# Patient Record
Sex: FEMALE | Race: WHITE
[De-identification: names, ages, dates, MRNs, and addresses within clinical notes are randomized per-mention and may not be internally consistent; named-entity substitution may affect disease eponyms.]

---

## 2018-02-27 ENCOUNTER — HOSPITAL ENCOUNTER (OUTPATIENT)
Dept: HOSPITAL 45 - C.ACU | Age: 48
Discharge: HOME | End: 2018-02-27
Attending: OTOLARYNGOLOGY
Payer: COMMERCIAL

## 2018-02-27 VITALS — DIASTOLIC BLOOD PRESSURE: 65 MMHG | SYSTOLIC BLOOD PRESSURE: 126 MMHG | HEART RATE: 78 BPM | OXYGEN SATURATION: 96 %

## 2018-02-27 VITALS
BODY MASS INDEX: 46.35 KG/M2 | WEIGHT: 245.51 LBS | BODY MASS INDEX: 46.35 KG/M2 | BODY MASS INDEX: 46.35 KG/M2 | HEIGHT: 60.98 IN | WEIGHT: 245.51 LBS | HEIGHT: 60.98 IN

## 2018-02-27 VITALS
DIASTOLIC BLOOD PRESSURE: 72 MMHG | HEART RATE: 60 BPM | TEMPERATURE: 97.88 F | OXYGEN SATURATION: 98 % | SYSTOLIC BLOOD PRESSURE: 116 MMHG

## 2018-02-27 VITALS
TEMPERATURE: 98.24 F | DIASTOLIC BLOOD PRESSURE: 56 MMHG | HEART RATE: 62 BPM | SYSTOLIC BLOOD PRESSURE: 112 MMHG | OXYGEN SATURATION: 93 %

## 2018-02-27 VITALS — DIASTOLIC BLOOD PRESSURE: 64 MMHG | OXYGEN SATURATION: 96 % | SYSTOLIC BLOOD PRESSURE: 128 MMHG | HEART RATE: 80 BPM

## 2018-02-27 VITALS — DIASTOLIC BLOOD PRESSURE: 62 MMHG | SYSTOLIC BLOOD PRESSURE: 138 MMHG | OXYGEN SATURATION: 96 % | HEART RATE: 82 BPM

## 2018-02-27 DIAGNOSIS — Z99.89: ICD-10-CM

## 2018-02-27 DIAGNOSIS — Z85.850: ICD-10-CM

## 2018-02-27 DIAGNOSIS — G47.33: ICD-10-CM

## 2018-02-27 DIAGNOSIS — K21.9: ICD-10-CM

## 2018-02-27 DIAGNOSIS — Z85.828: ICD-10-CM

## 2018-02-27 DIAGNOSIS — Z90.49: ICD-10-CM

## 2018-02-27 DIAGNOSIS — J34.2: ICD-10-CM

## 2018-02-27 DIAGNOSIS — K58.9: ICD-10-CM

## 2018-02-27 DIAGNOSIS — J32.2: Primary | ICD-10-CM

## 2018-02-27 DIAGNOSIS — E66.01: ICD-10-CM

## 2018-02-27 DIAGNOSIS — N30.40: ICD-10-CM

## 2018-02-27 DIAGNOSIS — Z90.710: ICD-10-CM

## 2018-02-27 DIAGNOSIS — E03.9: ICD-10-CM

## 2018-02-27 DIAGNOSIS — F17.210: ICD-10-CM

## 2018-02-27 NOTE — DISCHARGE INSTRUCTIONS
Discharge Instructions


Date of Service


Feb 27, 2018.





Admission


Reason for Admission:  Chronic Maxillary Sinusitis, Deviated Septum, B/L





Discharge


Discharge Diagnosis / Problem:  Recurrent sinusitis, deviated nasal septum, 

turbinate hypertrophy.





Discharge Goals


Goal(s):  Improve function





Activity Recommendations


Activity Limitations:  as noted below


Lifting Limitations:  no more than 5 pounds


Exercise/Sports Limitations:  none


May Resume Sexual Activity:  after one week


Shower/Bathe:  no limitations


Driving or Machine Use:  


No driving a motor vehicle while taking a narcotic.


.





Current Hospital Diet


Patient's current hospital diet:





Discharge Diet


Recommended Diet:  Regular Diet





Pending Studies


Studies pending at discharge:  no





Medical Emergencies








.


Who to Call and When:





Medical Emergencies:  If at any time you feel your situation is an emergency, 

please call 911 immediately.





.





Non-Emergent Contact


Non-Emergency issues call your:  Specialist


Call Non-Emergent contact if:  temperature is above 101.5





.


.








"Provider Documentation" section prepared by Edgardo De La Torre.








.





VTE Core Measure


Inpt VTE Proph given/why not?:  SCD's

## 2018-02-27 NOTE — OPERATIVE REPORT
DATE OF OPERATION:  02/27/2018

 

SURGEON:  Dr. De La Torre.

 

ANESTHESIA:  General via endotracheal tube.

 

PROCEDURE PERFORMED:

1.  Nasal and sinus surgical endoscopy using image guidance resulting in:



   A.  Bilateral anterior ethmoidectomy (30871-86).

   B.  Left maxillary sinusotomy with removal of retention cyst (80977).

   C.  Right maxillary sinusotomy (99919).

 

2.  Septoplasty (17133).



3.  Bilateral inferior turbinate reduction (03805-44).

 

INDICATIONS FOR THE PROCEDURE:  This is a 48-year-old woman with recurrent

acute sinusitis meeting criteria for nasal and sinus surgical endoscopy and

she also suffers from nasal airway obstruction secondary to a deviated nasal

septum and turbinate hypertrophy.  Given the failure of medical management,

the patient has opted for surgical management.  A full informed consent

including the indications, risks, benefits, and alternatives was provided in

a relaxed office setting.  There was an opportunity for questions and

answers.

 

PREOPERATIVE DIAGNOSES:

1.  Chronic sinusitis.

2.  Deviated nasal septum.

3.  Bilateral inferior turbinate hypertrophy.

 

POSTOPERATIVE DIAGNOSES:

1.  Chronic sinusitis.

2.  Deviated nasal septum.

3.  Bilateral inferior turbinate hypertrophy.

 

BLOOD LOSS:  75 mL.

 

SPECIMENS SENT:  None.

 

COMPLICATIONS:  None.

 

SPONGE AND NEEDLE COUNT:  Correct at the end the case.

 

DESCRIPTION OF PROCEDURE:  The patient had an uneventful endotracheal

intubation via the CRNA.  The table was placed in reverse Trendelenburg

position, the oropharynx was packed with a vaginal pack soaked in saline. 

The septum, inferior turbinates, middle turbinates, and anterior ethmoidal

bullae were injected with 20 mL of 1% lidocaine 1:100,000 parts epinephrine. 

A 10 mL control syringe and a 25 gauge spinal needle were used to achieve

this.

 

After the injections, the middle meatus was packed with 2 neurosurgical

pledgets soaked in 4% cocaine.  An additional 2 neurosurgical pledgets per

side were soaked in 0.05% oxymetazoline and placed in the nose.

 

Equipment was readied, and the patient was draped while I scrubbed.  About 5

minutes was allowed for vasoconstriction.

 

First, attention was directed to the septum.  A Jesus incision was made on

the left side of the nose and a mucoperichondrial flap was elevated.  The

quadrangular cartilage was disarticulated from the perpendicular plate of the

ethmoid and the vomer using a [place] elevator.  A small strip of

quadrangular cartilage at its attachment of the maxillary crest was removed. 

Relaxing incisions were made in the quadrangular cartilage.  Also, a deviated

portion of the vomer and the left nasal airway was fractured back to the

midline.  The septum, which had previously been deviated to the left nasal

airway was now in the midline.  The incision was closed with running 4-0

chromic suture.  After the sinus work was completed, Xomed bivalve splints were 
placed on

each side of the septum, and held in place with a transseptal 2-0 silk suture.

 

After completing the septoplasty, attention was directed to the inferior

turbinates bilaterally.  The identical procedure was done for each inferior

turbinate.  Using the microdebrider, the lateral mucosa of each inferior

turbinate and submucosal bone was largely removed with the microdebrider. 

Remaining medial surface mucosa was then decreased in size using the

Arthrocare coblation plasma 1 on a setting of 6 for coblation.  There were

2 mucosal passes made on the medial surface of each inferior turbinate

lasting 10 seconds each.  There was 1 pass made on the remaining lateral surface

lasting 10 seconds.  This effected a nice reduction in inferior turbinate

size bilaterally.

 

The remaining part of the procedure was performed using the Medtronix Fusion

navigation system.  The technique involved use of a 30 endoscope with

surgical loops and surgical headlight used interchangeably.  The anterior

portion of the each middle turbinate was removed using first a curved

tonsillar hemostat.  Then, turbinate scissors were used to cut on the crush

line.  This provided excellent approach to the anterior ethmoidal bulla

bilaterally which was entered with straight suction and then opened with the

microdebrider.

 

Bilaterally, the maxillary sinuses were entered with a curved suction tip and

backbiting forceps were used to create wide sinusotomies.  No tissue was

removed from the right maxillary sinus.  On the left maxillary sinus, there

was a retention cyst that was removed with a curved Medtronix microdebrider. 

The previous microdebrider work had been with a straight microdebrider.  The

curved microdebrider allowed for easy removal of the retention cyst of the

left maxillary sinus.  Any nuisance bleeding was stopped with suction

electrocautery.  The ethmoid defect was filled with FloSeal followed by a

Nasopore, impregnated in bacitracin solution.  Telfa was wrapped around

straws and then dipped in bacitracin ointment and placed along the floor of

the nose bilaterally.  The patency of each straw was checked with a straight

suction tip and the straws were patent.  Mustache dressing was positioned.

 

I removed the vaginal pack placed at the start of the case from the

oropharynx.  The oropharynx was irrigated with saline and I checked the

oropharynx by suctioning with the [employer] BEARD[employer] LADYuer suction to make sure there was no

blood coming down from the nasopharynx to the oropharynx.  There were no

blood clots in the oropharynx or hypopharynx.  There was no loose bone

matter.  I placed an oral airway under direct visualization for the CRNA. 

With the patient breathing on her own, she was extubated and transported to

recovery in no apparent distress.

 

SUMMARY OF FINDINGS:

1. Deviated septum to the left side.

2. Bilateral inferior turbinate hypertrophy.

3. Minimally edematous ethmoid sinus mucosa.

4. Right maxillary sinus mucosa within normal limits.

5. A retention cyst, left maxillary sinus.

 

CONDITION:  The patient was transported to recovery in no apparent distress.

 

 

I attest to the content of the Intraoperative Record and any orders documented 
therein. Any exceptions are noted below.

 

 

 

MTDD

## 2018-02-27 NOTE — [facility] POST OPERATIVE BRIEF NOTE
Immediate Operative Summary


Operative Date


Feb 27, 2018.





Pre-Operative Diagnosis





Chronic sinusitis, deviated nasal septum, bilateral inferiorturbinate


hypetrophy.





Post-Operative Diagnosis





Same





Procedure(s) Performed





Bilateral Anterior Ethmoidectomy, Left Maxillary Sinusotomy with Removal of 


Tissue, Right Maxillary Sinusotomy, Septoplasty, Bilateral Inferior Turbinate 


Reduction





Surgeon


Dr De La Torre





Assistant Surgeon(s)


none





Estimated Blood Loss


75ml





Findings


Consistent with Post-Op Diagnosis





Specimens





none





Drains


None





Anesthesia Type


General





Complication(s)


none





Disposition


Accompanied Pt To Recover:  no


Disposition:  Recovery Room / PACU

## 2018-02-27 NOTE — ANESTHESIOLOGY PROGRESS NOTE
Anesthesia Post Op Note


Date & Time


Feb 27, 2018 at 11:19





Vital Signs


Pain Intensity:  0





Vital Signs Past 12 Hours








  Date Time  Temp Pulse Resp B/P (MAP) Pulse Ox O2 Delivery O2 Flow Rate FiO2


 


2/27/18 11:10  68 16 137/74 97 Oxymask 3 


 


2/27/18 11:00  68 16 120/45 96 Oxymask 5 


 


2/27/18 10:51 36.1 74 18 141/85 93 Oxymask 10 


 


2/27/18 07:25 36.8 62 18 112/56 (74) 93 Room Air  











Notes


Mental Status:  alert / awake / arousable, participated in evaluation


Pt Amnestic to Procedure:  Yes


Nausea / Vomiting:  adequately controlled


Pain:  adequately controlled


Airway Patency, RR, SpO2:  stable & adequate


BP & HR:  stable & adequate


Hydration State:  stable & adequate


Anesthetic Complications:  no major complications apparent

## 2018-05-03 ENCOUNTER — HOSPITAL ENCOUNTER (EMERGENCY)
Dept: HOSPITAL 45 - C.EDB | Age: 48
Discharge: HOME | End: 2018-05-03
Payer: COMMERCIAL

## 2018-05-03 VITALS — DIASTOLIC BLOOD PRESSURE: 54 MMHG | HEART RATE: 63 BPM | SYSTOLIC BLOOD PRESSURE: 101 MMHG | OXYGEN SATURATION: 95 %

## 2018-05-03 VITALS
HEIGHT: 62.01 IN | BODY MASS INDEX: 44.86 KG/M2 | WEIGHT: 246.92 LBS | WEIGHT: 246.92 LBS | HEIGHT: 62.01 IN | BODY MASS INDEX: 44.86 KG/M2

## 2018-05-03 VITALS — TEMPERATURE: 98.06 F

## 2018-05-03 DIAGNOSIS — K58.9: ICD-10-CM

## 2018-05-03 DIAGNOSIS — Z85.828: ICD-10-CM

## 2018-05-03 DIAGNOSIS — M79.602: Primary | ICD-10-CM

## 2018-05-03 DIAGNOSIS — Z85.850: ICD-10-CM

## 2018-05-03 DIAGNOSIS — K21.9: ICD-10-CM

## 2018-05-03 DIAGNOSIS — F41.9: ICD-10-CM

## 2018-05-03 DIAGNOSIS — Z72.0: ICD-10-CM

## 2018-05-03 DIAGNOSIS — Z79.899: ICD-10-CM

## 2018-05-03 DIAGNOSIS — E66.9: ICD-10-CM

## 2018-05-03 DIAGNOSIS — E78.5: ICD-10-CM

## 2018-05-03 DIAGNOSIS — Z88.8: ICD-10-CM

## 2018-05-03 DIAGNOSIS — Z85.41: ICD-10-CM

## 2018-05-03 LAB
ALBUMIN SERPL-MCNC: 3.8 GM/DL (ref 3.4–5)
ALP SERPL-CCNC: 89 U/L (ref 45–117)
ALT SERPL-CCNC: 54 U/L (ref 12–78)
AST SERPL-CCNC: 27 U/L (ref 15–37)
BASOPHILS # BLD: 0.03 K/UL (ref 0–0.2)
BASOPHILS NFR BLD: 0.4 %
BUN SERPL-MCNC: 15 MG/DL (ref 7–18)
CALCIUM SERPL-MCNC: 9.1 MG/DL (ref 8.5–10.1)
CO2 SERPL-SCNC: 27 MMOL/L (ref 21–32)
CREAT SERPL-MCNC: 0.89 MG/DL (ref 0.6–1.2)
EOS ABS #: 0.11 K/UL (ref 0–0.5)
EOSINOPHIL NFR BLD AUTO: 236 K/UL (ref 130–400)
GLUCOSE SERPL-MCNC: 81 MG/DL (ref 70–99)
HCT VFR BLD CALC: 40.7 % (ref 37–47)
HGB BLD-MCNC: 14.3 G/DL (ref 12–16)
IG#: 0.03 K/UL (ref 0–0.02)
IMM GRANULOCYTES NFR BLD AUTO: 39.9 %
LIPASE: 71 U/L (ref 73–393)
LYMPHOCYTES # BLD: 2.96 K/UL (ref 1.2–3.4)
MCH RBC QN AUTO: 31.5 PG (ref 25–34)
MCHC RBC AUTO-ENTMCNC: 35.1 G/DL (ref 32–36)
MCV RBC AUTO: 89.6 FL (ref 80–100)
MONO ABS #: 0.57 K/UL (ref 0.11–0.59)
MONOCYTES NFR BLD: 7.7 %
NEUT ABS #: 3.72 K/UL (ref 1.4–6.5)
NEUTROPHILS # BLD AUTO: 1.5 %
NEUTROPHILS NFR BLD AUTO: 50.1 %
PMV BLD AUTO: 10.6 FL (ref 7.4–10.4)
POTASSIUM SERPL-SCNC: 3.5 MMOL/L (ref 3.5–5.1)
PROT SERPL-MCNC: 7.7 GM/DL (ref 6.4–8.2)
RED CELL DISTRIBUTION WIDTH CV: 13 % (ref 11.5–14.5)
RED CELL DISTRIBUTION WIDTH SD: 42.3 FL (ref 36.4–46.3)
SODIUM SERPL-SCNC: 138 MMOL/L (ref 136–145)
WBC # BLD AUTO: 7.42 K/UL (ref 4.8–10.8)

## 2018-05-03 NOTE — DIAGNOSTIC IMAGING REPORT
CERVICAL SPINE 6 VIEWS



CLINICAL HISTORY: Neck pain and left shoulder pain.



FINDINGS: AP, lateral, bilateral oblique, swimmer's, and odontoid views of the

cervical spine are obtained. No prior studies are available for comparison at

the time of dictation. The skeletal structures are well mineralized. There is no

radiographic evidence of fracture or subluxation. The odontoid process and

lateral masses appear intact on the open mouth view. The spinolaminar line is

preserved. Vertebral body height and alignment are maintained. There is

straightening of the cervical lordosis. Small anterior osteophytes are seen in

the lower cervical spine. The spinous processes appear intact. The

intervertebral disc spaces are maintained. A posterior disc osteophyte complex

at C5-C6 may contribute to mild acquired compromise the central canal. There is

no evidence of neuroforaminal stenosis on the oblique views. The prevertebral

soft tissues are within normal limits. Visualized apical lung parenchyma appears

clear.



IMPRESSION: 



1. No acute bony abnormality is seen involving the cervical spine.



2. Mild spondylotic change as above, greatest at C5-C6.







Electronically signed by:  Edgardo Umaña M.D.

5/3/2018 2:49 PM



Dictated Date/Time:  5/3/2018 2:48 PM

## 2018-05-03 NOTE — DIAGNOSTIC IMAGING REPORT
L SHOULDER MIN 2 VIEWS ROUTINE



HISTORY:  48 years-old Female L shoulder and neck pain acute left shoulder pain



COMPARISON: Chest radiograph of same day



TECHNIQUE: 3 views of the left shoulder



FINDINGS: 

Mild degenerative changes about the AC joint. No acute fracture, dislocation or

significant glenohumeral degenerative changes. Imaged lung fields appear clear.

No opaque foreign body or acute displaced rib fracture.



IMPRESSION: 

1. No acute fracture or dislocation.

2. Mild degenerative changes of the left AC joint. 







The above report was generated using voice recognition software. It may contain

grammatical, syntax or spelling errors.







Electronically signed by:  Onesimo Zimmer M.D.

5/3/2018 2:46 PM



Dictated Date/Time:  5/3/2018 2:45 PM

## 2018-05-03 NOTE — DIAGNOSTIC IMAGING REPORT
CHEST 1 VW FRONT-NOT PORTABLE



HISTORY:  48 years-old Female CHEST PAIN acute atypical chest pain



COMPARISON: Left shoulder radiographs of same day



TECHNIQUE: PA view of the chest



FINDINGS: 

Cardiomediastinal and hilar silhouettes are within normal limits. There is no

pneumothorax, pleural effusion, focal airspace consolidation or overt pulmonary

edema. The bones of the chest appear grossly intact.



IMPRESSION: No acute process. 







The above report was generated using voice recognition software. It may contain

grammatical, syntax or spelling errors.







Electronically signed by:  Onesimo Zimmer M.D.

5/3/2018 2:47 PM



Dictated Date/Time:  5/3/2018 2:46 PM

## 2018-05-03 NOTE — EMERGENCY ROOM VISIT NOTE
ED Visit Note


First contact with patient:  13:33


Chief Complaint: Left arm pain.





History of Present Illness: Ms. Narayanan is a 48 year-old white female who 

ambulates into the ED accompanied by her mother complaining of left arm pain.  


Historically patient reports no history of individual cardiovascular disease or 

first-degree family coronary artery disease, but does report multiple 

grandparents on both sides of the family had heart disease of unspecified 

etiology.  Patient's risk factors include obesity, tobacco use and dyslipidemia.


Patient reports she awoke this morning her normal self and was getting ready 

for work when she reports she had a acute onset of left upper extremity pain.  

She reports her pain started in the forearm and gradually went into the upper 

arm, shoulder and then the upper left chest.  She reports this episode lasted 

about 2 hours and then self resolved and we returned to additional times during 

the day.  She describes her pain as a soreness sensation.  Currently she rates 

her discomfort 4/10.  Her pain is minimally aggravated with movements of the 

elbow and shoulder and with palpation of the mid forearm, proximal radius and 

left upper anterior chest.  She has not identified any alleviating factors 

related to this discomfort.  She has not taken any medications for her 

discomfort prior to arrival at the hospital.  She denies any associated fevers, 

chills, sweats, skin eruptions, skin color changes, upper respiratory tract 

symptoms, wheezing, cough, shortness of breath, orthopnea, dependent edema, 

previous clots, claudication, cramping, recent surgery/inactivity/extended 

travel, abdominal pain, nausea, vomiting, diarrhea, constipation, rectal 

bleeding, black/tarry stools, urinary symptoms, back/flank pain.





Review of Systems: As noted above in history of present illness. All body 

systems were reviewed and found to be negative as noted above.





Past Medical History: Anxiety, hidradenitis, cystitis, sleep apnea, regional 

enteritis, irritable bowel syndrome, obesity, sinusitis, chronic rhinitis, 

nasal septum deviation, migraine headaches, skin cancer, cervical cancer, 

thyroid cancer with postsurgical hypothyroidism, esophageal reflux, status post 

carpal tunnel release, cholecystectomy, D&C, hysterectomy,  section.


Current Medications:








 Medications  Dose


 Route/Sig


 Max Daily Dose Days Date Category


 


 Claritin


  (Loratadine) 10


 Mg Tab  10 Mg


 PO QAM


    18 Reported


 


 Multivitamin


  (Multivitamins)


 Tab  1 Tab


 PO HS


    18 Reported


 


 Nicotine 14 Mg/24


 Hr Dis  1 Patch


 TOP DAILY


    18 Reported


 


 Flonase Allergy


 Relief


  (Fluticasone


 Propionate


  (Nasal)) 50


 Mcg/Act Spr  2 Sprays


 NA QAM


    18 Reported


 


 Vitamin B-12


  (Cyanocobalamin)


 500 Mcg Tab  500 Mcg


 PO QAM


    18 Reported


 


 Ventolin Hfa


  (Albuterol) 200


 Puffs/79235 Mcg


 Aers  2 Puffs


 INH Q6H PRN


    18 Reported


 


 Mag-Ox (Magnesium


 Oxide) 400 Mg Tab  400 Mg


 PO QAM


    18 Reported


 


 Valtrex


  (Valacyclovir


 Hcl) 1 Gm Tab  1,000 Mg


 PO BID PRN


    18 Reported


 


 Linzess


  (Linaclotide) 145


 Mcg Cap  145 Mcg


 PO QAM


    18 Reported


 


 Zantac


  (Ranitidine HCl)


 150 Mg Tab  150 Mg


 PO BID


    18 Reported


 


 Ditropan


  (Oxybutynin


 Chloride) 5 Mg Tab  5 Mg


 PO HS


    18 Reported


 


 Requip


  (Ropinirole HCl)


 0.5 Mg Tab  0.5 Mg


 PO HS


    18 Reported


 


 Imitrex


  (Sumatriptan


 Succinate) 50 Mg


 Tab  50 Mg


 PO PRN PRN


    18 Reported


 


 Paxil (Paroxetine


 HCl) 20 Mg Tab  20 Mg


 PO QPM


    18 Reported


 


 Topamax


  (Topiramate) 50


 Mg Tab  50 Mg


 PO DAILY PRN


    18 Reported


 


 Cranberry


 Concentrate


  (Cranberry-Vitamin


 C-Vitamin E) 1


 Cap Cap  1 Cap


 PO QAM


    18 Reported


 


 Synthroid


  (Levothyroxine


 Sodium) 200 Mcg


 Tab  200 Mcg


 PO QAM


    18 Reported





Allergies to Medications: Modafinil.


Social History: Patient is currently employed; she feels safe in her home 

environment; she admits to tobacco use.





Physical Examination:


Vital Signs: 








  Date Time  Temp Pulse Resp B/P (MAP) Pulse Ox O2 Delivery O2 Flow Rate FiO2


 


5/3/18 16:01  63 22 101/54 95 Room Air  


 


5/3/18 14:07  59      


 


5/3/18 14:05  59 18 101/58 98 Room Air  


 


5/3/18 12:53 36.7 63 16 143/87 100 Room Air  





GENERAL: 48-year-old female in mild distress due to pain, nontoxic-appearing, 

afebrile and hemodynamically stable.


NEUROLOGICAL: Awake, alert and oriented to person, place and time.  Answering 

questions appropriately and following commands.  Normal gait.  Good hand eye 

coordination.  


SKIN: Warm, dry and pink.  No soft tissue eruptions or trauma noted.


HEENT:  Atraumatic and normocephalic.  PERRLA.  Sclera white and conjunctiva 

pink.  Oral cavity moist and pink.  Pharynx is nonerythematous or edematous.  

Speech normal.  No lymphadenopathy.  Trachea midline.  No jugular venous 

distention.  No carotid bruits.


BACK:  No tenderness over the bony spine.  No CVA tenderness.  


THORAX:  Lungs sounds are clear to auscultation and equal bilaterally with 

symmetrical chest wall.  No wheezing, rales or rhonchi.  No crepitus, tenderness

, subcutaneous air or deformities noted.


HEART:  Regular rate and rhythm.  No gallops, rubs or murmurs are appreciated.  

No lifts, heaves or thrills.  PMI is not displaced.


ABDOMEN: Obese, soft and nontender.  Positive bowel sounds in all quadrants.  

No guarding, rigidity or organomegaly.


EXTREMITIES:  Moves all extremities well on command and with purpose.  All 

distal neurovascular statuses are intact and equal bilaterally.  No dependent 

edema or calf tenderness/cords.





ED Course:


Patient is assessed as noted above.


Patient's medication list was reviewed.


Laboratory Testing:








Test


  5/3/18


14:13 5/3/18


14:23 5/3/18


16:08 Range/Units


 


 


White Blood Count 7.42   4.8-10.8  K/uL


 


Red Blood Count 4.54   4.2-5.4  M/uL


 


Hemoglobin 14.3   12.0-16.0  g/dL


 


Hematocrit 40.7   37-47  %


 


Mean Corpuscular Volume 89.6     fL


 


Mean Corpuscular Hemoglobin 31.5   25-34  pg


 


Mean Corpuscular Hemoglobin


Concent 35.1


  


  


  32-36  g/dl


 


 


Platelet Count 236   130-400  K/uL


 


Mean Platelet Volume 10.6   7.4-10.4  fL


 


Neutrophils (%) (Auto) 50.1    %


 


Lymphocytes (%) (Auto) 39.9    %


 


Monocytes (%) (Auto) 7.7    %


 


Eosinophils (%) (Auto) 1.5    %


 


Basophils (%) (Auto) 0.4    %


 


Neutrophils # (Auto) 3.72   1.4-6.5  K/uL


 


Lymphocytes # (Auto) 2.96   1.2-3.4  K/uL


 


Monocytes # (Auto) 0.57   0.11-0.59  K/uL


 


Eosinophils # (Auto) 0.11   0-0.5  K/uL


 


Basophils # (Auto) 0.03   0-0.2  K/uL


 


RDW Standard Deviation 42.3   36.4-46.3  fL


 


RDW Coefficient of Variation 13.0   11.5-14.5  %


 


Immature Granulocyte % (Auto) 0.4    %


 


Immature Granulocyte # (Auto) 0.03   0.00-0.02  K/uL


 


Sodium Level 138   136-145  mmol/L


 


Potassium Level 3.5   3.5-5.1  mmol/L


 


Chloride Level 108     mmol/L


 


Carbon Dioxide Level 27   21-32  mmol/L


 


Anion Gap 3.0   3-11  mmol/L


 


Blood Urea Nitrogen 15   7-18  mg/dl


 


Creatinine


  0.89


  


  


  0.60-1.20


mg/dl


 


Est Creatinine Clear Calc


Drug Dose 93.4


  


  


   ml/min


 


 


Estimated GFR (


American) 90.1


  


  


   


 


 


Estimated GFR (Non-


American 77.7


  


  


   


 


 


BUN/Creatinine Ratio 16.6   10-20  


 


Random Glucose 81   70-99  mg/dl


 


Calcium Level 9.1   8.5-10.1  mg/dl


 


Total Bilirubin 0.3   0.2-1  mg/dl


 


Direct Bilirubin < 0.1   0-0.2  mg/dl


 


Aspartate Amino Transf


(AST/SGOT) 27


  


  


  15-37  U/L


 


 


Alanine Aminotransferase


(ALT/SGPT) 54


  


  


  12-78  U/L


 


 


Alkaline Phosphatase 89     U/L


 


Total Protein 7.7   6.4-8.2  gm/dl


 


Albumin 3.8   3.4-5.0  gm/dl


 


Lipase 71     U/L


 


Bedside Troponin I  < 0.030 < 0.030 0-0.045  ng/ml





Chest X-Ray: Was read by myself and the radiologist and shows no acute 

infiltrates, effusions or pneumothorax.  Normal heart silhouette and bony 

anatomy.


Left Shoulder X-Rays: Was read by myself and the radiologist showing no acute 

fractures or dislocations.  Radiologist notes mild degenerative changes of the 

left acromion clavicular joint.


Cervical Spine X-Rays: Were read by myself and the radiologist showing no acute 

bony abnormalities, mild spondylotic changes greatest at C5-C6 area.


EKG #1: 1402 was read by myself and reviewed with Dr. Prince; shows sinus 

bradycardia with a ventricular rate of 53 bpm.  Normal axis, intervals and 

complexes.  No acute ST changes indicating ischemia, injury or infarction.  

Medical records were reviewed and there were no previous for comparison.


EKG #2: 1548 was read by myself and reviewed with Dr. Prince; shows sinus 

bradycardia with a ventricular rate of 56 bpm.  Normal axis, intervals and 

complexes.  No acute ST changes indicating ischemia, injury or infarction.  

This was compared to her previous and no acute changes were noted.


Patient was hydrated with normal saline; she was offered pain medication and 

refused.


Patient was reassessed multiple times during her stay in the emergency 

department.


Patient's case was reviewed with Dr. Prince; we agreed on diagnostic 

approach, treatment, disposition and plan.


Patient was educated about today's findings and instructed on her treatment plan

; she verbalized understanding and agreement with this plan.





Clinical Impression: Left upper extremity pain.





Decision-Making: Initially my differential diagnosis I considered acute 

coronary syndrome, shoulder/elbow arthritis, radicular cervical spine symptoms, 

muscle strain, and other causes.





Disposition: Patient discharged home in stable condition accompanied by her 

mother; prior to departure she was reassessed and subjectively reported that 

she was pain and symptom-free.





Plan:


Patient was encouraged alternate ibuprofen and acetaminophen every 3 hours as 

needed for pain.


Patient was encouraged use ice on areas of pain 5-6 times a day for 20-30 

minutes.


Patient was encouraged to rest her arm and avoid strenuous physical activities.


Patient was encouraged to follow-up with her PCP for recheck in 2-3 days if no 

better.


Patient was encouraged to return to the ED for worsening/uncontrolled pain, 

chest pain, shortness of breath, arm weakness/numbness/tingling or any new/

concerning symptoms.